# Patient Record
Sex: FEMALE | Race: WHITE | URBAN - METROPOLITAN AREA
[De-identification: names, ages, dates, MRNs, and addresses within clinical notes are randomized per-mention and may not be internally consistent; named-entity substitution may affect disease eponyms.]

---

## 2014-06-16 LAB — HIV 1+2 AB+HIV1 P24 AG SERPL QL IA: NEGATIVE

## 2014-06-17 LAB
HBV SURFACE AG SERPL QL IA: NEGATIVE
SYPHILIS RPR SCREEN - HISTORICAL: NORMAL

## 2020-11-12 DIAGNOSIS — Z11.59 ENCOUNTER FOR SCREENING FOR OTHER VIRAL DISEASES: Primary | ICD-10-CM

## 2020-11-16 DIAGNOSIS — Z11.59 ENCOUNTER FOR SCREENING FOR OTHER VIRAL DISEASES: Primary | ICD-10-CM

## 2020-11-20 ENCOUNTER — AMBULATORY - HEALTHEAST (OUTPATIENT)
Dept: FAMILY MEDICINE | Facility: CLINIC | Age: 30
End: 2020-11-20

## 2020-11-20 DIAGNOSIS — Z11.59 ENCOUNTER FOR SCREENING FOR OTHER VIRAL DISEASES: ICD-10-CM

## 2020-11-21 ENCOUNTER — COMMUNICATION - HEALTHEAST (OUTPATIENT)
Dept: TELEHEALTH | Facility: CLINIC | Age: 30
End: 2020-11-21

## 2020-11-21 ENCOUNTER — AMBULATORY - HEALTHEAST (OUTPATIENT)
Dept: FAMILY MEDICINE | Facility: CLINIC | Age: 30
End: 2020-11-21

## 2020-11-21 ENCOUNTER — COMMUNICATION - HEALTHEAST (OUTPATIENT)
Dept: ADMINISTRATIVE | Facility: OTHER | Age: 30
End: 2020-11-21

## 2020-11-21 DIAGNOSIS — Z11.59 ENCOUNTER FOR SCREENING FOR OTHER VIRAL DISEASES: ICD-10-CM

## 2020-11-22 LAB
SARS-COV-2 PCR COMMENT: NORMAL
SARS-COV-2 RNA SPEC QL NAA+PROBE: NEGATIVE
SARS-COV-2 VIRUS SPECIMEN SOURCE: NORMAL

## 2020-11-23 ENCOUNTER — COMMUNICATION - HEALTHEAST (OUTPATIENT)
Dept: SCHEDULING | Facility: CLINIC | Age: 30
End: 2020-11-23

## 2020-11-23 ENCOUNTER — ANESTHESIA EVENT (OUTPATIENT)
Dept: SURGERY | Facility: AMBULATORY SURGERY CENTER | Age: 30
End: 2020-11-23

## 2020-11-23 NOTE — ANESTHESIA PREPROCEDURE EVALUATION
Anesthesia Pre-Procedure Evaluation    Patient: Nevaeh Aguilar   MRN:     [id number] Gender:   female   Age:    30 year old :      1990        Preoperative Diagnosis: Encounter for cosmetic surgery [Z41.1]   Procedure(s):  Breast Augmentation  [place] mastopexies     LABS:  CBC: No results found for: WBC, HGB, HCT, PLT  BMP: No results found for: NA, POTASSIUM, CHLORIDE, CO2, BUN, CR, GLC  COAGS: No results found for: PTT, INR, FIBR  POC: No results found for: BGM, HCG, HCGS  OTHER: No results found for: PH, LACT, A1C, SABRINA, PHOS, MAG, ALBUMIN, PROTTOTAL, ALT, AST, GGT, ALKPHOS, BILITOTAL, BILIDIRECT, LIPASE, AMYLASE, KEY, TSH, T4, T3, CRP, SED     Preop Vitals    BP Readings from Last 3 Encounters:   No data found for BP    Pulse Readings from Last 3 Encounters:   No data found for Pulse      Resp Readings from Last 3 Encounters:   No data found for Resp    SpO2 Readings from Last 3 Encounters:   No data found for SpO2      Temp Readings from Last 1 Encounters:   No data found for Temp    Ht Readings from Last 1 Encounters:   No data found for Ht      Wt Readings from Last 1 Encounters:   No data found for Wt    There is no height or weight on file to calculate BMI.     LDA:        History reviewed. No pertinent past medical history.   History reviewed. No pertinent surgical history.   No Known Allergies              PHYSICAL EXAM:   Mental Status/Neuro: A/A/O   Airway: Facies: Feasible  Mallampati: I  Mouth/Opening: Full  TM distance: > 6 cm  Neck ROM: Full   Respiratory:   Resp. Rate: Normal     Resp. Effort: Normal      CV:   Rate: Age appropriate  Edema: None   Comments:      Dental: Normal Dentition                Assessment:   ASA SCORE: 2    H&P: History and physical reviewed and following examination; no interval change.    NPO Status: NPO Appropriate     Plan:   Anes. Type:  General   Pre-Medication: None   Induction:  IV (Standard)   Airway: ETT; Oral   Access/Monitoring: PIV   Maintenance:  Balanced     Postop Plan:   Postop Pain: Opioids  Postop Sedation/Airway: Not planned  Disposition: Outpatient     PONV Management:   Adult Risk Factors: Female, Postop Opioids   Prevention: Ondansetron, Dexamethasone     CONSENT: Direct conversation   Plan and risks discussed with: Patient                      Bautista Ward MD     ANESTHESIA PREOP EVALUATION    PROCEDURE: Procedure(s):  Breast Augmentation  [place] mastopexies    HPI: Nevaeh Aguilar is a 30 year old female who presents for above procedure.    PAST MEDICAL HISTORY:    History reviewed. No pertinent past medical history.    PAST SURGICAL HISTORY:    History reviewed. No pertinent surgical history.    SOCIAL HISTORY:       Social History     Tobacco Use     Smoking status: Former Smoker   Substance Use Topics     Alcohol use: Yes       ALLERGIES:     No Known Allergies    MEDICATIONS:     (Not in a hospital admission)      No current outpatient medications on file.       No current Epic-ordered outpatient medications on file.     No current Epic-ordered facility-administered medications on file.        PHYSICAL EXAM:    Vitals: T Data Unavailable, P Data Unavailable, BP Data Unavailable, R Data Unavailable, SpO2  , Weight   Wt Readings from Last 2 Encounters:   No data found for Wt       See doc flowsheet    NPO STATUS: see doc flowsheet    LABS:    BMP:  No results for input(s): NA, POTASSIUM, CHLORIDE, CO2, BUN, CR, GLC, SABRINA in the last 24945 hours.    LFTs:   No results for input(s): PROTTOTAL, ALBUMIN, BILITOTAL, ALKPHOS, AST, ALT, BILIDIRECT in the last 39487 hours.    CBC:   No results for input(s): WBC, RBC, HGB, HCT, MCV, MCH, MCHC, RDW, PLT in the last 06082 hours.    Coags:  No results for input(s): INR, PTT, FIBR in the last 53363 hours.    Imaging:  No orders to display       Bautista Ward MD  Anesthesiology Staff  Pager [phone]    11/23/2020  1:07 PM

## 2020-11-24 ENCOUNTER — HOSPITAL ENCOUNTER (OUTPATIENT)
Facility: AMBULATORY SURGERY CENTER | Age: 30
Discharge: HOME | End: 2020-11-24
Attending: PLASTIC SURGERY | Admitting: PLASTIC SURGERY
Payer: COMMERCIAL

## 2020-11-24 ENCOUNTER — ANESTHESIA (OUTPATIENT)
Dept: SURGERY | Facility: AMBULATORY SURGERY CENTER | Age: 30
End: 2020-11-24

## 2020-11-24 VITALS
RESPIRATION RATE: 18 BRPM | DIASTOLIC BLOOD PRESSURE: 74 MMHG | OXYGEN SATURATION: 100 % | HEIGHT: 63 IN | WEIGHT: 126 LBS | TEMPERATURE: 97.8 F | HEART RATE: 105 BPM | BODY MASS INDEX: 22.32 KG/M2 | SYSTOLIC BLOOD PRESSURE: 116 MMHG

## 2020-11-24 DIAGNOSIS — R52 PAIN: Primary | ICD-10-CM

## 2020-11-24 DIAGNOSIS — R11.0 NAUSEA AFTER ANESTHESIA, INITIAL ENCOUNTER: ICD-10-CM

## 2020-11-24 DIAGNOSIS — B99.9 INFECTION: ICD-10-CM

## 2020-11-24 DIAGNOSIS — T88.59XA NAUSEA AFTER ANESTHESIA, INITIAL ENCOUNTER: ICD-10-CM

## 2020-11-24 LAB — HCG UR QL: NEGATIVE

## 2020-11-24 PROCEDURE — G8916 PT W IV AB GIVEN ON TIME: HCPCS

## 2020-11-24 PROCEDURE — 81025 URINE PREGNANCY TEST: CPT | Performed by: STUDENT IN AN ORGANIZED HEALTH CARE EDUCATION/TRAINING PROGRAM

## 2020-11-24 PROCEDURE — L8600 IMPLANT BREAST SILICONE/EQ: HCPCS

## 2020-11-24 PROCEDURE — G8907 PT DOC NO EVENTS ON DISCHARG: HCPCS

## 2020-11-24 PROCEDURE — 360N000089 HC SURGERY LEVEL COSMETIC 120 MIN

## 2020-11-24 DEVICE — IMPLANTABLE DEVICE: Type: IMPLANTABLE DEVICE | Site: BREAST | Status: FUNCTIONAL

## 2020-11-24 RX ORDER — PROPOFOL 10 MG/ML
INJECTION, EMULSION INTRAVENOUS PRN
Status: DISCONTINUED | OUTPATIENT
Start: 2020-11-24 | End: 2020-11-24

## 2020-11-24 RX ORDER — DIAZEPAM 10 MG
10 TABLET ORAL EVERY 12 HOURS PRN
Status: DISCONTINUED | OUTPATIENT
Start: 2020-11-24 | End: 2020-11-25 | Stop reason: HOSPADM

## 2020-11-24 RX ORDER — ONDANSETRON 2 MG/ML
4 INJECTION INTRAMUSCULAR; INTRAVENOUS EVERY 30 MIN PRN
Status: DISCONTINUED | OUTPATIENT
Start: 2020-11-24 | End: 2020-11-25 | Stop reason: HOSPADM

## 2020-11-24 RX ORDER — NALOXONE HYDROCHLORIDE 0.4 MG/ML
0.4 INJECTION, SOLUTION INTRAMUSCULAR; INTRAVENOUS; SUBCUTANEOUS
Status: DISCONTINUED | OUTPATIENT
Start: 2020-11-24 | End: 2020-11-25 | Stop reason: HOSPADM

## 2020-11-24 RX ORDER — SODIUM CHLORIDE, SODIUM LACTATE, POTASSIUM CHLORIDE, CALCIUM CHLORIDE 600; 310; 30; 20 MG/100ML; MG/100ML; MG/100ML; MG/100ML
INJECTION, SOLUTION INTRAVENOUS CONTINUOUS
Status: DISCONTINUED | OUTPATIENT
Start: 2020-11-24 | End: 2020-11-25 | Stop reason: HOSPADM

## 2020-11-24 RX ORDER — MEPERIDINE HYDROCHLORIDE 25 MG/ML
12.5 INJECTION INTRAMUSCULAR; INTRAVENOUS; SUBCUTANEOUS
Status: DISCONTINUED | OUTPATIENT
Start: 2020-11-24 | End: 2020-11-25 | Stop reason: HOSPADM

## 2020-11-24 RX ORDER — ACETAMINOPHEN 325 MG/1
975 TABLET ORAL ONCE
Status: COMPLETED | OUTPATIENT
Start: 2020-11-24 | End: 2020-11-24

## 2020-11-24 RX ORDER — KETOROLAC TROMETHAMINE 30 MG/ML
30 INJECTION, SOLUTION INTRAMUSCULAR; INTRAVENOUS EVERY 6 HOURS PRN
Status: DISCONTINUED | OUTPATIENT
Start: 2020-11-24 | End: 2020-11-25 | Stop reason: HOSPADM

## 2020-11-24 RX ORDER — DEXAMETHASONE SODIUM PHOSPHATE 4 MG/ML
10 INJECTION, SOLUTION INTRA-ARTICULAR; INTRALESIONAL; INTRAMUSCULAR; INTRAVENOUS; SOFT TISSUE
Status: COMPLETED | OUTPATIENT
Start: 2020-11-24 | End: 2020-11-24

## 2020-11-24 RX ORDER — HYDROMORPHONE HYDROCHLORIDE 1 MG/ML
.3-.5 INJECTION, SOLUTION INTRAMUSCULAR; INTRAVENOUS; SUBCUTANEOUS EVERY 10 MIN PRN
Status: DISCONTINUED | OUTPATIENT
Start: 2020-11-24 | End: 2020-11-25 | Stop reason: HOSPADM

## 2020-11-24 RX ORDER — CEFAZOLIN SODIUM 2 G/100ML
2 INJECTION, SOLUTION INTRAVENOUS
Status: COMPLETED | OUTPATIENT
Start: 2020-11-24 | End: 2020-11-24

## 2020-11-24 RX ORDER — OXYCODONE AND ACETAMINOPHEN 5; 325 MG/1; MG/1
2 TABLET ORAL
Status: DISCONTINUED | OUTPATIENT
Start: 2020-11-24 | End: 2020-11-25 | Stop reason: HOSPADM

## 2020-11-24 RX ORDER — HYDROXYZINE HYDROCHLORIDE 25 MG/1
25 TABLET, FILM COATED ORAL
Status: DISCONTINUED | OUTPATIENT
Start: 2020-11-24 | End: 2020-11-25 | Stop reason: HOSPADM

## 2020-11-24 RX ORDER — OXYCODONE HYDROCHLORIDE 5 MG/1
5-10 TABLET ORAL EVERY 4 HOURS PRN
Status: DISCONTINUED | OUTPATIENT
Start: 2020-11-24 | End: 2020-11-25 | Stop reason: HOSPADM

## 2020-11-24 RX ORDER — LIDOCAINE 40 MG/G
CREAM TOPICAL
Status: DISCONTINUED | OUTPATIENT
Start: 2020-11-24 | End: 2020-11-25 | Stop reason: HOSPADM

## 2020-11-24 RX ORDER — ONDANSETRON 4 MG/1
4 TABLET, ORALLY DISINTEGRATING ORAL
Status: DISCONTINUED | OUTPATIENT
Start: 2020-11-24 | End: 2020-11-25 | Stop reason: HOSPADM

## 2020-11-24 RX ORDER — FENTANYL CITRATE 50 UG/ML
25-50 INJECTION, SOLUTION INTRAMUSCULAR; INTRAVENOUS
Status: DISCONTINUED | OUTPATIENT
Start: 2020-11-24 | End: 2020-11-25 | Stop reason: HOSPADM

## 2020-11-24 RX ORDER — OXYCODONE AND ACETAMINOPHEN 5; 325 MG/1; MG/1
1-2 TABLET ORAL EVERY 4 HOURS PRN
Qty: 20 TABLET | Refills: 0
Start: 2020-11-24

## 2020-11-24 RX ORDER — NALOXONE HYDROCHLORIDE 0.4 MG/ML
0.2 INJECTION, SOLUTION INTRAMUSCULAR; INTRAVENOUS; SUBCUTANEOUS
Status: DISCONTINUED | OUTPATIENT
Start: 2020-11-24 | End: 2020-11-25 | Stop reason: HOSPADM

## 2020-11-24 RX ORDER — HYDROXYZINE PAMOATE 25 MG/1
25 CAPSULE ORAL EVERY 6 HOURS PRN
Qty: 30 CAPSULE | Refills: 0
Start: 2020-11-24

## 2020-11-24 RX ORDER — ONDANSETRON 2 MG/ML
INJECTION INTRAMUSCULAR; INTRAVENOUS PRN
Status: DISCONTINUED | OUTPATIENT
Start: 2020-11-24 | End: 2020-11-24

## 2020-11-24 RX ORDER — DEXAMETHASONE SODIUM PHOSPHATE 4 MG/ML
4 INJECTION, SOLUTION INTRA-ARTICULAR; INTRALESIONAL; INTRAMUSCULAR; INTRAVENOUS; SOFT TISSUE EVERY 10 MIN PRN
Status: DISCONTINUED | OUTPATIENT
Start: 2020-11-24 | End: 2020-11-25 | Stop reason: HOSPADM

## 2020-11-24 RX ORDER — BUPIVACAINE HYDROCHLORIDE AND EPINEPHRINE 2.5; 5 MG/ML; UG/ML
INJECTION, SOLUTION INFILTRATION; PERINEURAL PRN
Status: DISCONTINUED | OUTPATIENT
Start: 2020-11-24 | End: 2020-11-24 | Stop reason: HOSPADM

## 2020-11-24 RX ORDER — FENTANYL CITRATE 50 UG/ML
INJECTION, SOLUTION INTRAMUSCULAR; INTRAVENOUS PRN
Status: DISCONTINUED | OUTPATIENT
Start: 2020-11-24 | End: 2020-11-24

## 2020-11-24 RX ORDER — LIDOCAINE HYDROCHLORIDE 20 MG/ML
INJECTION, SOLUTION INFILTRATION; PERINEURAL PRN
Status: DISCONTINUED | OUTPATIENT
Start: 2020-11-24 | End: 2020-11-24

## 2020-11-24 RX ORDER — PROPOFOL 10 MG/ML
INJECTION, EMULSION INTRAVENOUS CONTINUOUS PRN
Status: DISCONTINUED | OUTPATIENT
Start: 2020-11-24 | End: 2020-11-24

## 2020-11-24 RX ORDER — ONDANSETRON 4 MG/1
4-8 TABLET, ORALLY DISINTEGRATING ORAL EVERY 8 HOURS PRN
Qty: 4 TABLET | Refills: 0
Start: 2020-11-24

## 2020-11-24 RX ORDER — ONDANSETRON 4 MG/1
4 TABLET, ORALLY DISINTEGRATING ORAL EVERY 30 MIN PRN
Status: DISCONTINUED | OUTPATIENT
Start: 2020-11-24 | End: 2020-11-25 | Stop reason: HOSPADM

## 2020-11-24 RX ORDER — CEPHALEXIN 500 MG/1
500 CAPSULE ORAL 2 TIMES DAILY
Qty: 28 CAPSULE | Refills: 0
Start: 2020-11-24 | End: 2020-12-01

## 2020-11-24 RX ORDER — ALBUTEROL SULFATE 0.83 MG/ML
2.5 SOLUTION RESPIRATORY (INHALATION) EVERY 4 HOURS PRN
Status: DISCONTINUED | OUTPATIENT
Start: 2020-11-24 | End: 2020-11-25 | Stop reason: HOSPADM

## 2020-11-24 RX ADMIN — Medication 0.5 MG: at 08:16

## 2020-11-24 RX ADMIN — ACETAMINOPHEN 975 MG: 325 TABLET ORAL at 06:51

## 2020-11-24 RX ADMIN — SODIUM CHLORIDE, SODIUM LACTATE, POTASSIUM CHLORIDE, CALCIUM CHLORIDE: 600; 310; 30; 20 INJECTION, SOLUTION INTRAVENOUS at 07:00

## 2020-11-24 RX ADMIN — CEFAZOLIN SODIUM 2 G: 2 INJECTION, SOLUTION INTRAVENOUS at 07:23

## 2020-11-24 RX ADMIN — LIDOCAINE HYDROCHLORIDE 60 MG: 20 INJECTION, SOLUTION INFILTRATION; PERINEURAL at 07:19

## 2020-11-24 RX ADMIN — PROPOFOL 200 MG: 10 INJECTION, EMULSION INTRAVENOUS at 07:19

## 2020-11-24 RX ADMIN — SODIUM CHLORIDE, SODIUM LACTATE, POTASSIUM CHLORIDE, CALCIUM CHLORIDE: 600; 310; 30; 20 INJECTION, SOLUTION INTRAVENOUS at 09:04

## 2020-11-24 RX ADMIN — CEFAZOLIN SODIUM 1 G: 2 INJECTION, SOLUTION INTRAVENOUS at 09:23

## 2020-11-24 RX ADMIN — Medication 40 MG: at 07:19

## 2020-11-24 RX ADMIN — FENTANYL CITRATE 50 MCG: 50 INJECTION, SOLUTION INTRAMUSCULAR; INTRAVENOUS at 07:19

## 2020-11-24 RX ADMIN — ONDANSETRON 4 MG: 2 INJECTION INTRAMUSCULAR; INTRAVENOUS at 08:31

## 2020-11-24 RX ADMIN — PROPOFOL 40 MG: 10 INJECTION, EMULSION INTRAVENOUS at 08:21

## 2020-11-24 RX ADMIN — PROPOFOL 200 MCG/KG/MIN: 10 INJECTION, EMULSION INTRAVENOUS at 07:19

## 2020-11-24 RX ADMIN — FENTANYL CITRATE 50 MCG: 50 INJECTION, SOLUTION INTRAMUSCULAR; INTRAVENOUS at 07:32

## 2020-11-24 RX ADMIN — Medication 0.5 MG: at 08:21

## 2020-11-24 RX ADMIN — OXYCODONE HYDROCHLORIDE 5 MG: 5 TABLET ORAL at 10:12

## 2020-11-24 RX ADMIN — SODIUM CHLORIDE, SODIUM LACTATE, POTASSIUM CHLORIDE, CALCIUM CHLORIDE: 600; 310; 30; 20 INJECTION, SOLUTION INTRAVENOUS at 07:12

## 2020-11-24 RX ADMIN — Medication 10 MG: at 07:59

## 2020-11-24 RX ADMIN — Medication 20 MG: at 07:39

## 2020-11-24 RX ADMIN — DEXAMETHASONE SODIUM PHOSPHATE 10 MG: 4 INJECTION, SOLUTION INTRA-ARTICULAR; INTRALESIONAL; INTRAMUSCULAR; INTRAVENOUS; SOFT TISSUE at 07:27

## 2020-11-24 ASSESSMENT — MIFFLIN-ST. JEOR: SCORE: 1265.66

## 2020-11-24 NOTE — DISCHARGE INSTRUCTIONS
Tylenol given at 7am    [facility]   Adult Discharge Orders & Instructions     For 24 hours after surgery    1. Get plenty of rest.  A responsible adult must stay with you for at least 24 hours after you leave the hospital.   2. Do not drive or use heavy equipment.  If you have weakness or tingling, don't drive or use heavy equipment until this feeling goes away.  3. Do not drink alcohol.  4. Avoid strenuous or risky activities.  Ask for help when climbing stairs.   5. You may feel lightheaded.  IF so, sit for a few minutes before standing.  Have someone help you get up.   6. If you have nausea (feel sick to your stomach): Drink only clear liquids such as apple juice, ginger ale, broth or 7-Up.  Rest may also help.  Be sure to drink enough fluids.  Move to a regular diet as you feel able.  7. You may have a slight fever. Call the doctor if your fever is over 100 F (37.7 C) (taken under the tongue) or lasts longer than 24 hours.  8. You may have a dry mouth, a sore throat, muscle aches or trouble sleeping.  These should go away after 24 hours.  9. Do not make important or legal decisions.     Call your doctor for any of the followin.  Signs of infection (fever, growing tenderness at the surgery site, a large amount of drainage or bleeding, severe pain, foul-smelling drainage, redness, swelling).    2. It has been over 8 to 10 hours since surgery and you are still not able to urinate (pass water).    3.  Headache for over 24 hours.    4.  Numbness, tingling or weakness the day after surgery (if you had spinal anesthesia).                  5. Signs of Covid-19 infection (temperature over 100 degrees, shortness of breath, cough, loss of taste/smell, generalized body aches, persistent headache,                  chills, sore throat, nausea/vomiting/diarrhea).    To contact Dr Simon call:    [phone] - Day  [phone] - After hours pager  ___________________________________

## 2020-11-24 NOTE — ANESTHESIA CARE TRANSFER NOTE
Patient: Nevaeh Aguilar    Procedure(s):  Breast Augmentation  [place] mastopexies    Diagnosis: Encounter for cosmetic surgery [Z41.1]  Diagnosis Additional Information: No value filed.    Anesthesia Type:   General     Note:  Airway :Room Air  Patient transferred to:PACU  Comments: VSS IV and airway patentHandoff Report: Identifed the Patient, Identified the Reponsible Provider, Reviewed the pertinent medical history, Discussed the surgical course, Reviewed Intra-OP anesthesia mangement and issues during anesthesia, Set expectations for post-procedure period and Allowed opportunity for questions and acknowledgement of understanding      Vitals: (Last set prior to Anesthesia Care Transfer)    CRNA VITALS  11/24/2020 0910 - 11/24/2020 1010      11/24/2020             Pulse:  104    SpO2:  (!) 87 %    Resp Rate (observed):  11                Electronically Signed By: Kristina Garcia  November 24, 2020  2:06 PM

## 2020-11-24 NOTE — ANESTHESIA POSTPROCEDURE EVALUATION
Anesthesia POST Procedure Evaluation    Patient: Nevaeh Aguilar   MRN:     [id number] Gender:   female   Age:    30 year old :      1990        Preoperative Diagnosis: Encounter for cosmetic surgery [Z41.1]   Procedure(s):  Breast Augmentation  [place] mastopexies   Postop Comments: No value filed.     Anesthesia Type: General       Disposition: Outpatient   Postop Pain Control: Uneventful            Sign Out: Well controlled pain   PONV: No   Neuro/Psych: Uneventful            Sign Out: Acceptable/Baseline neuro status   Airway/Respiratory: Uneventful            Sign Out: Acceptable/Baseline resp. status   CV/Hemodynamics: Uneventful            Sign Out: Acceptable CV status   Other NRE: NONE   DID A NON-ROUTINE EVENT OCCUR? No         Last Anesthesia Record Vitals:  CRNA VITALS  2020 0910 - 2020 1010      2020             Pulse:  104    SpO2:  (!) 87 %    Resp Rate (observed):  11          Last PACU Vitals:  Vitals Value Taken Time   /70 20 1015   Temp 97.8  F (36.6  C) 20 0946   Pulse 90 20 1015   Resp 8 20 1015   SpO2 99 % 20 1015   Temp src     NIBP     Pulse     SpO2     Resp     Temp     Ht Rate     Temp 2           Electronically Signed By: Bautista Ward MD, 2020, 2:35 PM

## 2020-11-24 NOTE — BRIEF OP NOTE
[facility] Brief Operative Note    Pre-operative diagnosis: Hypomastia, postpartum involution and grade I ptosis   Post-operative diagnosis same   Procedure: Procedure(s):  Breast Augmentation  [place] mastopexies   Surgeon(s): Surgeon(s) and Role:     * Igor Small MD - Primary   Estimated blood loss: minimal   Specimens: none   Findings: none   Assist: Sylvia Martin  Complications: none  Condition: extubated and stable to PAR    Igor Small MD

## 2020-11-28 NOTE — OP NOTE
Procedure Date: 11/24/2020      PREOPERATIVE DIAGNOSES:   1.  Bilateral hypomastia.   2.  Postpartum involution.   3.  Bilateral ptotic nipple areolar complexes.      PROCEDURES:  Bilateral augmentation mammoplasty through an inframammary fold incision with the implant in a submuscular position.      IMPLANTS:   1.  Left breast implant:  Sientra HSC smooth round high profile 385 mL cohesive gel breast implant, reference number [id number], serial #[id number].   2.  Right breast implant:  Sientra 385 mL HSC smooth round high profile, cohesive gel breast implant, reference number [id number], serial #[id number].   3.  Bilateral crescent mastopexies.      SURGEON:  Igor Small MD      ANESTHETIC:  General, using a laryngeal masked airway.      INDICATIONS:  The patient is a very pleasant and attractive 30-year-old female who has 2 boys, ages 5 and 9.  The patient experienced postpartum involution after having her children.  Her nipple areolar complexes are slightly low on the breast mound and I gave her the option of performing a crescent mastopexy to have the nipple areolar complex better centered on the breast mound.  The patient has agreed to this addition to her operation and understands the location of incision running from 3 o'clock to 9 o'clock at the pigmentary change between the pigmented areola and chest skin.  The patient understands that breast implants are not considered a lifetime medical device and she will likely have to have them replaced within her lifetime.  The current generation of Sientra HSC gel breast implants do carry a lifetime warranty.  The patient understands there is a phenomenon known as silent rupture where the implant may fail and neither the surgeon or patient are aware of this.  I recommend MRI surveillance for silent rupture at 8-10 years postoperatively.      The patient understands there is a phenomenon of capsular contracture where the body may form an aggressive scar capsule  around the breast implant.  Rate of capsular contracture in my practice is between 1% and 2% when implant is placed in a submuscular position through an inframammary fold incision while using breast implant pocket irrigations and a March funnel which allows for a no-touch technique when placing the implant.  The patient was told of submuscular breast implant placement confers a 3-4 fold decrease in rate of capsular contracture compared with subglandular breast augmentation.  Subglandular breast augmentation carries a capsular contracture rate in the 12%-16% range depending on the various studies and this can be lowered to approximately the 3%-4% range by placing the implant in a submuscular position.  The use of an inframammary fold incision in replacement of the implant has been shown to confer a 10-fold reduction in the rate of capsular contracture when compared with use of a periareolar incision.  This was shown in clinical studies performed by Montez Calvert MD from [facility].  In his studies, rate of capsular contracture through an inframammary fold incision was 0.59%, whereas his capsular contracture rate was 9.5% when a periareolar incision was used.  Use of breast pocket irrigation consisting of Ancef, gentamicin and bacitracin has been shown the rate of capsular contracture in clinical studies performed by Immanuel Tello MD from [facility].  Last maneuver I use is use of a March funnel for placement of the implant.  The March funnel allows for a no-touch technique when placing the implant, which should theoretically decrease the chance of developing a biofilm and therefore diminish the chance of capsular contracture.  The patient understands that she should tell her mammographer she has breast implants when age appropriate.  Special mammographic views known as Judi views are used to best visualize the breast following augmentation mammoplasty.  The patient was  told of the condition known as breast implant-associated lymphoma.  Approximately 400-500 cases been reported in worldwide literature to date and all have been shown to occur in patients where a textured implant was chosen.  In my practice dating to 1998, I have used only smooth implants.  No cases of breast implant-associated lymphoma have occurred with the use of smoothed breast implants.  The patient understands that her incision will be in the inframammary fold.  She understands the risks of the operation include a chance of capsular contracture, implant malposition, asymmetry, hypertrophic scarring and possible dissatisfaction with cosmetic outcome.  Complications such as bleeding and infection are extremely rare in my practice.  I had 1 postoperative bleed and 1 single infection necessitating implant removal, dating to 1994.  The patient has been given a chance to ask questions and all were answered.  The patient provides her informed consent using our in-office consent and again the morning of surgery at [facility].      DESCRIPTION OF PROCEDURE AND FINDINGS:  In the preoperative holding area, the inframammary fold was marked as was the crescent mastopexy.  Informed consent was then obtained.  This is informed consent in addition to the informed consent she signed at her preoperative evaluation.  The patient was then taken to the operating room, placed in supine position on the operating room table.  A successful general anesthetic was then induced using a laryngeal mask airway.  The patient received 2 grams of Ancef and 10 mg of Decadron intravenously prior to commencing with surgery.  The patient's chest was then prepped and draped in routine sterile fashion.  The nipple-areolar complexes were covered with 3M Tegaderm dressings.  A timeout was called at 7:32 a.m.  Operative start time was 7:33 a.m.      A 4 cm incision was made in the right inframammary fold and  through this incision, dissection was carried down to the pectoralis major muscle.  I dissected several centimeters above the inframammary fold on the clavipectoral fascia.  Then, using a Ayaz retractor, I established a submuscular plane.  Muscle was partially released from 3 o'clock to 6 o'clock on the right side.  Intercostal perforating vessels were cauterized using an insulated DeBakey type bayonet style monopolar cautery as I came upon them.  Finger dissection was used laterally to avoid any traction of the fourth intercostal nerve.  Superiorly, dissection was made between the pectoralis major and pectoralis minor muscles using a blunt uterine sound.  A sizer was placed and filled with air, which gave nice shape to her breast.  This also identified areas where further attenuation of the medial fibers of pectoralis major was needed and this was accomplished surgically.  The right breast pocket was then irrigated with solution consisting of 1 gram of Ancef, 80 mg of gentamicin and bacitracin and normal saline.  This was done in accordance with studies by Immanuel Tello MD from [facility] as a proven means of diminishing the chance of capsular contracture.  The dissection pocket was then injected with 0.25% Marcaine with Kenalog for postoperative analgesia and for the anti-inflammatory properties of steroids.  Right breast pocket was then again irrigated with the Ancef, gentamicin, bacitracin solution.      Attention was then directed to the left side, identical operation was performed.  On the left side, the muscle was partially released from 6 o'clock to 9 o'clock.  Again, a sizer was placed and filled with air, which gave nice shape to her breast and identified areas where further attenuation of medial fibers of the pectoralis major was needed and this was accomplished surgically.  I then injected the dissection pocket with 0.25% Marcaine with 1:200,000 epinephrine and Kenalog for  postoperative analgesia and for the anti-inflammatory properties of steroids.  The Ancef, gentamicin, bacitracin solution was then irrigated into left breast pocket as well.      Sientra 385 mL HSC smooth, round, high profile, cohesive gel breast implants were chosen.  Their reference numbers and serial numbers can be found at the beginning of this operative report.  At this point, all operative personnel changed their gloves.  The implants were opened on the back table.  The Ancef, gentamicin, bacitracin solution was then irrigated into the left breast pocket into the breast pockets.  The implants were soaked in the Ancef, gentamicin, bacitracin solution.  A March funnel was then opened and trimmed to the appropriate size.  The funnel was lubricated with the Ancef, gentamicin, bacitracin solution.  The implant was then transferred from its sterile container to the March funnel on the back table.  The implant was then inserted into the right breast pocket utilizing a no-touch technique.  Closure consisted of 3-0 Vicryl sutures in the deep muscular layer.  Deep subcutaneous layer was closed using 4-0 PDS suture in buried interrupted fashion.  Deep dermal sutures were placed using 4-0 Monocryl suture in buried interrupted fashion.  The skin was then run using 4-0 Prolene suture in running and buried continuous running intracuticular fashion and 6-0 Prolene sutures were placed at each end of the incision for exact location of the skin.      Attention was then directed to the left side.  Once again, all operative personnel changed their gloves.  The March funnel was lubricated with the antibiotic solution.  The Sientra implant was then transferred from its sterile container to the March funnel and the March funnel was utilized to place the implant utilizing a no-touch technique.  Closure was identical from left to right.  Dressing consisted of 3M half-inch Steri-Strips over the inframammary fold incision followed  by Adaptic.      Attention was then directed to the crescent mastopexy.  The preplaced Tegaderm dressings were removed.  The premarked crescent of skin was then excised.  Inset of the crescent mastopexy was performed using 4-0 PDS sutures in buried and interrupted deep dermal fashion.  This was identical on the right and left sides.  The crescent mastopexy was then run using 4-0 Prolene suture in continuous running and buried intercuticular fashion. Then, 6-0 Prolene sutures were then placed for exact coaptation of the skin.  Dressing of the crescent mastopexy consisted of Mastisol and 3M half-inch Steri-Strips were placed in clau-hexagonal fashion.      ESTIMATED BLOOD LOSS:  Minimal.      COMPLICATIONS:  None.      CONDITION:  Stable to postanesthesia recovery.      OPERATIVE TIMES:  In-room time 7:12 a.m.  Operative start 7:33 a.m.  Operative end time 9:32 a.m. Out of room time 9:40 a.m. It should be noted that the patient's paid surgical operative time was 2 hours and the operation was completed in 1 hour and 59 minutes.         SIMON LAURENT MD             D: 2020   T: 2020   MT: [email or web address]      Name:     KG BREEN   MRN:      [id number]        Account:        [id number]   :      1990           Procedure Date: 2020      Document: [id number]

## 2021-01-09 ENCOUNTER — HEALTH MAINTENANCE LETTER (OUTPATIENT)
Age: 31
End: 2021-01-09

## 2021-06-05 ENCOUNTER — RECORDS - HEALTHEAST (OUTPATIENT)
Dept: ULTRASOUND IMAGING | Facility: CLINIC | Age: 31
End: 2021-06-05

## 2021-06-05 ENCOUNTER — RECORDS - HEALTHEAST (OUTPATIENT)
Dept: LAB | Facility: CLINIC | Age: 31
End: 2021-06-05

## 2021-06-05 DIAGNOSIS — Z36.89 ENCOUNTER FOR FETAL ANATOMIC SURVEY: ICD-10-CM

## 2021-06-05 DIAGNOSIS — O46.8X9: ICD-10-CM

## 2021-06-05 DIAGNOSIS — Z33.1 PREGNANT STATE, INCIDENTAL: ICD-10-CM

## 2021-10-11 ENCOUNTER — HEALTH MAINTENANCE LETTER (OUTPATIENT)
Age: 31
End: 2021-10-11

## 2022-01-30 ENCOUNTER — HEALTH MAINTENANCE LETTER (OUTPATIENT)
Age: 32
End: 2022-01-30

## 2022-09-24 ENCOUNTER — HEALTH MAINTENANCE LETTER (OUTPATIENT)
Age: 32
End: 2022-09-24

## 2023-05-08 ENCOUNTER — HEALTH MAINTENANCE LETTER (OUTPATIENT)
Age: 33
End: 2023-05-08
